# Patient Record
Sex: MALE | Race: WHITE | NOT HISPANIC OR LATINO | URBAN - METROPOLITAN AREA
[De-identification: names, ages, dates, MRNs, and addresses within clinical notes are randomized per-mention and may not be internally consistent; named-entity substitution may affect disease eponyms.]

---

## 2017-08-18 ENCOUNTER — OUTPATIENT (OUTPATIENT)
Dept: OUTPATIENT SERVICES | Facility: HOSPITAL | Age: 77
LOS: 1 days | End: 2017-08-18
Payer: MEDICARE

## 2017-08-18 VITALS
OXYGEN SATURATION: 98 % | WEIGHT: 292.99 LBS | RESPIRATION RATE: 16 BRPM | HEART RATE: 16 BPM | SYSTOLIC BLOOD PRESSURE: 130 MMHG | TEMPERATURE: 98 F | DIASTOLIC BLOOD PRESSURE: 80 MMHG | HEIGHT: 69.25 IN

## 2017-08-18 DIAGNOSIS — N47.1 PHIMOSIS: ICD-10-CM

## 2017-08-18 DIAGNOSIS — G47.33 OBSTRUCTIVE SLEEP APNEA (ADULT) (PEDIATRIC): ICD-10-CM

## 2017-08-18 DIAGNOSIS — R60.9 EDEMA, UNSPECIFIED: ICD-10-CM

## 2017-08-18 DIAGNOSIS — M19.90 UNSPECIFIED OSTEOARTHRITIS, UNSPECIFIED SITE: Chronic | ICD-10-CM

## 2017-08-18 DIAGNOSIS — Z96.659 PRESENCE OF UNSPECIFIED ARTIFICIAL KNEE JOINT: Chronic | ICD-10-CM

## 2017-08-18 DIAGNOSIS — Z98.890 OTHER SPECIFIED POSTPROCEDURAL STATES: Chronic | ICD-10-CM

## 2017-08-18 DIAGNOSIS — A80.9 ACUTE POLIOMYELITIS, UNSPECIFIED: ICD-10-CM

## 2017-08-18 DIAGNOSIS — E78.5 HYPERLIPIDEMIA, UNSPECIFIED: ICD-10-CM

## 2017-08-18 DIAGNOSIS — Z98.1 ARTHRODESIS STATUS: Chronic | ICD-10-CM

## 2017-08-18 DIAGNOSIS — Z96.649 PRESENCE OF UNSPECIFIED ARTIFICIAL HIP JOINT: Chronic | ICD-10-CM

## 2017-08-18 LAB
ALBUMIN SERPL ELPH-MCNC: 4.5 G/DL — SIGNIFICANT CHANGE UP (ref 3.3–5)
ALP SERPL-CCNC: 54 U/L — SIGNIFICANT CHANGE UP (ref 40–120)
ALT FLD-CCNC: 46 U/L — HIGH (ref 4–41)
AST SERPL-CCNC: 31 U/L — SIGNIFICANT CHANGE UP (ref 4–40)
BILIRUB SERPL-MCNC: 0.8 MG/DL — SIGNIFICANT CHANGE UP (ref 0.2–1.2)
BUN SERPL-MCNC: 15 MG/DL — SIGNIFICANT CHANGE UP (ref 7–23)
CALCIUM SERPL-MCNC: 9.7 MG/DL — SIGNIFICANT CHANGE UP (ref 8.4–10.5)
CHLORIDE SERPL-SCNC: 102 MMOL/L — SIGNIFICANT CHANGE UP (ref 98–107)
CO2 SERPL-SCNC: 24 MMOL/L — SIGNIFICANT CHANGE UP (ref 22–31)
CREAT SERPL-MCNC: 0.66 MG/DL — SIGNIFICANT CHANGE UP (ref 0.5–1.3)
GLUCOSE SERPL-MCNC: 90 MG/DL — SIGNIFICANT CHANGE UP (ref 70–99)
HCT VFR BLD CALC: 41.1 % — SIGNIFICANT CHANGE UP (ref 39–50)
HGB BLD-MCNC: 14 G/DL — SIGNIFICANT CHANGE UP (ref 13–17)
MCHC RBC-ENTMCNC: 32.9 PG — SIGNIFICANT CHANGE UP (ref 27–34)
MCHC RBC-ENTMCNC: 34.1 % — SIGNIFICANT CHANGE UP (ref 32–36)
MCV RBC AUTO: 96.5 FL — SIGNIFICANT CHANGE UP (ref 80–100)
NRBC # FLD: 0 — SIGNIFICANT CHANGE UP
PLATELET # BLD AUTO: 173 K/UL — SIGNIFICANT CHANGE UP (ref 150–400)
PMV BLD: 11.8 FL — SIGNIFICANT CHANGE UP (ref 7–13)
POTASSIUM SERPL-MCNC: 4.1 MMOL/L — SIGNIFICANT CHANGE UP (ref 3.5–5.3)
POTASSIUM SERPL-SCNC: 4.1 MMOL/L — SIGNIFICANT CHANGE UP (ref 3.5–5.3)
PROT SERPL-MCNC: 7 G/DL — SIGNIFICANT CHANGE UP (ref 6–8.3)
RBC # BLD: 4.26 M/UL — SIGNIFICANT CHANGE UP (ref 4.2–5.8)
RBC # FLD: 13.5 % — SIGNIFICANT CHANGE UP (ref 10.3–14.5)
SODIUM SERPL-SCNC: 142 MMOL/L — SIGNIFICANT CHANGE UP (ref 135–145)
WBC # BLD: 7.93 K/UL — SIGNIFICANT CHANGE UP (ref 3.8–10.5)
WBC # FLD AUTO: 7.93 K/UL — SIGNIFICANT CHANGE UP (ref 3.8–10.5)

## 2017-08-18 PROCEDURE — 93010 ELECTROCARDIOGRAM REPORT: CPT

## 2017-08-18 NOTE — H&P PST ADULT - PMH
Ankle weakness    Edema    HLD (hyperlipidemia)    Melanoma    OA (osteoarthritis)    Polio    Prostate cancer

## 2017-08-18 NOTE — H&P PST ADULT - PSH
H/O laminectomy    H/O melanoma excision  2016  H/O spinal fusion    H/O total knee replacement    History of hip replacement  right

## 2017-08-18 NOTE — H&P PST ADULT - NSANTHOSAYNRD_GEN_A_CORE
Never tested/No. KEN screening performed.  STOP BANG Legend: 0-2 = LOW Risk; 3-4 = INTERMEDIATE Risk; 5-8 = HIGH Risk

## 2017-08-18 NOTE — H&P PST ADULT - NEGATIVE ENMT SYMPTOMS
no hearing difficulty/no recurrent cold sores/no throat pain/no dysphagia/no nasal discharge/no nose bleeds/no dry mouth/no nasal congestion/no tinnitus/no abnormal taste sensation/no gum bleeding/no post-nasal discharge/no sinus symptoms/no ear pain/no nasal obstruction/no vertigo

## 2017-08-18 NOTE — H&P PST ADULT - NEGATIVE GASTROINTESTINAL SYMPTOMS
no flatulence/no nausea/no vomiting/no hematochezia/no change in bowel habits/no diarrhea/no constipation/no abdominal pain/no melena

## 2017-08-18 NOTE — H&P PST ADULT - FAMILY HISTORY
<<-----Click on this checkbox to enter Family History Family history of congenital heart disease     Father  Still living? No  Family history of lung cancer, Age at diagnosis: Age Unknown  Family history of colon cancer, Age at diagnosis: Age Unknown     Mother  Still living? No  Family history of stroke, Age at diagnosis: Age Unknown     Sibling  Still living? No  Family history of diabetes mellitus (DM), Age at diagnosis: Age Unknown

## 2017-08-18 NOTE — H&P PST ADULT - PROBLEM SELECTOR PLAN 4
Pt has braces B/L le for balance (had polio in the past and re occur). Pt on PT Q week. Continue treatment as ordered. Fall precautions to be maintained.

## 2017-08-18 NOTE — H&P PST ADULT - RS GEN PE MLT RESP DETAILS PC
clear to auscultation bilaterally/no rales/no wheezes/no rhonchi/no subcutaneous emphysema/respirations non-labored/breath sounds equal/good air movement/airway patent

## 2017-08-18 NOTE — H&P PST ADULT - PROBLEM SELECTOR PLAN 1
pt is scheduled for a circumcision on 9/13/17. Preop instructions provided including NPO status, Pepcid. Pt to Stop ASA on 9/6/17. Verbalized understanding.   Pt instructed to get medical clearance, form provided and EKG for comparison requested as ell. Verbalized understanding.

## 2017-08-18 NOTE — H&P PST ADULT - GASTROINTESTINAL DETAILS
no bruit/no rebound tenderness/no rigidity/soft/no masses palpable/bowel sounds normal/nontender/no distention/no guarding

## 2017-08-18 NOTE — H&P PST ADULT - MUSCULOSKELETAL
details… detailed exam no calf tenderness/decreased ROM/diminished strength/no joint erythema/no joint swelling/no joint warmth

## 2017-08-18 NOTE — H&P PST ADULT - NEGATIVE SKIN SYMPTOMS
no pitted nails/no change in size/color of mole/no tumor/no brittle nails/no dryness/no hair loss/no rash/no itching

## 2017-08-18 NOTE — H&P PST ADULT - HISTORY OF PRESENT ILLNESS
Prostate removed 2 years ago for prostate cancer, pt leaks urine when moving and physically performing activities. states prepuse keep urine that leaks and noted smell around area. Pt referred to Dr Lujan by his pcp for evaluation and sx inter 78 y/o male with PMH of Edema B/L LE, HLD, OA, prostate cancer (s/p TURP), melanoma and polio. Presents to PST with a preop dx of Phimosis and to be evaluated for a scheduled circumcision on 9/13/17. Pt states since his prostate was removed 2 years ago for prostate cancer, pt leaks urine when moving and performing heavy physically activities. States prepuce keep urine that leaks and noted smell around area. Pt referred to Dr Lujan who evaluated him and recommended circumcision at this time.

## 2017-08-18 NOTE — H&P PST ADULT - NEUROLOGICAL COMMENTS
Polio residual related, has cane and braces b/l le Polio residuals  related, has cane and wears leg brace b/l le

## 2017-08-18 NOTE — H&P PST ADULT - PROBLEM SELECTOR PLAN 2
Pt to continue HCTZ as ordered (needs it for edema b/l le). Potassium to continue as well and instructed to take in am of sx with a sip of water.

## 2017-08-18 NOTE — H&P PST ADULT - NEGATIVE GENERAL GENITOURINARY SYMPTOMS
no nocturia/no urinary hesitancy/no renal colic/no flank pain R/no urine discoloration/no bladder infections/no hematuria/no flank pain L/no dysuria/normal urinary frequency

## 2017-08-18 NOTE — H&P PST ADULT - NEGATIVE BREAST SYMPTOMS
no breast tenderness L/no breast tenderness R/no nipple discharge R/no breast lump R/no breast lump L/no nipple discharge L

## 2017-08-18 NOTE — H&P PST ADULT - NEGATIVE CARDIOVASCULAR SYMPTOMS
no orthopnea/no chest pain/no paroxysmal nocturnal dyspnea/no palpitations/no claudication/no dyspnea on exertion

## 2017-08-18 NOTE — H&P PST ADULT - NEGATIVE OPHTHALMOLOGIC SYMPTOMS
no diplopia/no discharge L/no irritation L/no loss of vision R/no scleral injection R/no blurred vision R/no pain L/no irritation R/no lacrimation L/no photophobia/no blurred vision L/no discharge R/no pain R/no loss of vision L/no scleral injection L/no lacrimation R

## 2017-08-18 NOTE — H&P PST ADULT - NEGATIVE GENERAL SYMPTOMS
no sweating/no weight loss/no polyuria/no polydipsia/no fatigue/no weight gain/no polyphagia/no malaise/no fever/no chills/no anorexia

## 2017-08-18 NOTE — H&P PST ADULT - NEGATIVE NEUROLOGICAL SYMPTOMS
no syncope/no tremors/no headache/no facial palsy/no vertigo/no loss of sensation/no confusion/no loss of consciousness/no hemiparesis/no focal seizures/no generalized seizures

## 2017-08-18 NOTE — H&P PST ADULT - NEGATIVE PSYCHIATRIC SYMPTOMS
no anxiety/no insomnia/no agitation/no visual hallucinations/no hyperactivity/no depression/no paranoia/no suicidal ideation/no memory loss/no mood swings/no auditory hallucinations

## 2017-09-13 ENCOUNTER — OUTPATIENT (OUTPATIENT)
Dept: OUTPATIENT SERVICES | Facility: HOSPITAL | Age: 77
LOS: 1 days | Discharge: HOME | End: 2017-09-13

## 2017-09-13 ENCOUNTER — TRANSCRIPTION ENCOUNTER (OUTPATIENT)
Age: 77
End: 2017-09-13

## 2017-09-13 VITALS
TEMPERATURE: 99 F | OXYGEN SATURATION: 93 % | RESPIRATION RATE: 18 BRPM | SYSTOLIC BLOOD PRESSURE: 147 MMHG | HEART RATE: 71 BPM | DIASTOLIC BLOOD PRESSURE: 68 MMHG

## 2017-09-13 VITALS
SYSTOLIC BLOOD PRESSURE: 153 MMHG | TEMPERATURE: 97 F | DIASTOLIC BLOOD PRESSURE: 76 MMHG | OXYGEN SATURATION: 97 % | HEART RATE: 58 BPM | RESPIRATION RATE: 20 BRPM | HEIGHT: 69.25 IN | WEIGHT: 292.99 LBS

## 2017-09-13 DIAGNOSIS — N47.1 PHIMOSIS: ICD-10-CM

## 2017-09-13 DIAGNOSIS — Z98.890 OTHER SPECIFIED POSTPROCEDURAL STATES: Chronic | ICD-10-CM

## 2017-09-13 DIAGNOSIS — Z96.659 PRESENCE OF UNSPECIFIED ARTIFICIAL KNEE JOINT: Chronic | ICD-10-CM

## 2017-09-13 DIAGNOSIS — Z96.649 PRESENCE OF UNSPECIFIED ARTIFICIAL HIP JOINT: Chronic | ICD-10-CM

## 2017-09-13 DIAGNOSIS — Z98.1 ARTHRODESIS STATUS: Chronic | ICD-10-CM

## 2017-09-13 NOTE — ASU DISCHARGE PLAN (ADULT/PEDIATRIC). - NOTIFY
Bleeding that does not stop/Fever greater than 101/Swelling that continues/Pain not relieved by Medications/Unable to Urinate/Persistent Nausea and Vomiting/Numbness, color, or temperature change to extremity

## 2017-09-13 NOTE — ASU DISCHARGE PLAN (ADULT/PEDIATRIC). - INSTRUCTIONS
Prevent constipation by increasing fluids and eating foods containing fiber, including but not limited to fresh fruits, vegetables, and whole grains  Avoid greasy, spicy, fried foods

## 2017-10-03 ENCOUNTER — TRANSCRIPTION ENCOUNTER (OUTPATIENT)
Age: 77
End: 2017-10-03

## 2023-10-19 NOTE — H&P PST ADULT - NS MD HP PULSE RADIAL
Kings Lopes: at  [phone]. Thanks! left normal/right normal